# Patient Record
Sex: FEMALE | Race: WHITE | NOT HISPANIC OR LATINO | URBAN - NONMETROPOLITAN AREA
[De-identification: names, ages, dates, MRNs, and addresses within clinical notes are randomized per-mention and may not be internally consistent; named-entity substitution may affect disease eponyms.]

---

## 2021-09-21 ENCOUNTER — OFFICE VISIT (OUTPATIENT)
Dept: URBAN - NONMETROPOLITAN AREA CLINIC 13 | Facility: CLINIC | Age: 56
End: 2021-09-21
Payer: COMMERCIAL

## 2021-09-21 DIAGNOSIS — S06.0X9S CONCUSSION W/ LOC, SEQUELA: Primary | ICD-10-CM

## 2021-09-21 PROCEDURE — 92004 COMPRE OPH EXAM NEW PT 1/>: CPT | Performed by: OPHTHALMOLOGY

## 2021-09-21 ASSESSMENT — INTRAOCULAR PRESSURE
OD: 14
OS: 16

## 2021-09-21 NOTE — IMPRESSION/PLAN
Impression: Concussion w/ LOC, sequela: S06.0x9S. Plan: normal ocular exam today. agree with order for MRI Brain. Patient has been scheduled for this exam. will defer to [facility] for f/u on head injury.   return 3-4 week for refraction and recheck eyes

## 2021-10-12 ENCOUNTER — OFFICE VISIT (OUTPATIENT)
Dept: URBAN - NONMETROPOLITAN AREA CLINIC 13 | Facility: CLINIC | Age: 56
End: 2021-10-12
Payer: COMMERCIAL

## 2021-10-12 PROCEDURE — 99213 OFFICE O/P EST LOW 20 MIN: CPT | Performed by: OPHTHALMOLOGY

## 2021-10-12 ASSESSMENT — VISUAL ACUITY
OD: 20/125
OS: 20/125

## 2021-10-12 NOTE — IMPRESSION/PLAN
Impression: Concussion w/ LOC, sequela: S06.0X9S. Plan: vision improving, offered encouragement.   will recheck in 4-6 weeks  observation for now

## 2023-03-03 ENCOUNTER — OFFICE VISIT (OUTPATIENT)
Dept: URBAN - NONMETROPOLITAN AREA CLINIC 13 | Facility: CLINIC | Age: 58
End: 2023-03-03
Payer: COMMERCIAL

## 2023-03-03 DIAGNOSIS — H52.03 HYPERMETROPIA, BILATERAL: Primary | ICD-10-CM

## 2023-03-03 DIAGNOSIS — H25.13 AGE-RELATED NUCLEAR CATARACT, BILATERAL: ICD-10-CM

## 2023-03-03 PROCEDURE — 92004 COMPRE OPH EXAM NEW PT 1/>: CPT | Performed by: OPTOMETRIST

## 2023-03-03 ASSESSMENT — INTRAOCULAR PRESSURE
OD: 9
OS: 9

## 2023-03-03 ASSESSMENT — VISUAL ACUITY
OS: 20/50
OD: 20/50

## 2023-03-03 NOTE — IMPRESSION/PLAN
Impression: Hypermetropia, bilateral: H52.03. Plan: gave new rx per pt request for now to improve vision